# Patient Record
Sex: MALE | Race: BLACK OR AFRICAN AMERICAN | NOT HISPANIC OR LATINO | Employment: FULL TIME | URBAN - METROPOLITAN AREA
[De-identification: names, ages, dates, MRNs, and addresses within clinical notes are randomized per-mention and may not be internally consistent; named-entity substitution may affect disease eponyms.]

---

## 2024-04-06 ENCOUNTER — HOSPITAL ENCOUNTER (EMERGENCY)
Facility: HOSPITAL | Age: 34
Discharge: HOME | End: 2024-04-06
Attending: INTERNAL MEDICINE
Payer: MEDICAID

## 2024-04-06 VITALS
SYSTOLIC BLOOD PRESSURE: 118 MMHG | RESPIRATION RATE: 21 BRPM | HEART RATE: 104 BPM | DIASTOLIC BLOOD PRESSURE: 67 MMHG | WEIGHT: 300 LBS | OXYGEN SATURATION: 95 % | BODY MASS INDEX: 42.95 KG/M2 | TEMPERATURE: 99 F | HEIGHT: 70 IN

## 2024-04-06 DIAGNOSIS — R55 SYNCOPE: ICD-10-CM

## 2024-04-06 DIAGNOSIS — R56.9 SEIZURE: Primary | ICD-10-CM

## 2024-04-06 LAB
ALBUMIN SERPL-MCNC: 3.8 G/DL (ref 3.5–5)
ALBUMIN/GLOB SERPL: 0.9 RATIO (ref 1.1–2)
ALP SERPL-CCNC: 75 UNIT/L (ref 40–150)
ALT SERPL-CCNC: 51 UNIT/L (ref 0–55)
AST SERPL-CCNC: 26 UNIT/L (ref 5–34)
BASOPHILS # BLD AUTO: 0.03 X10(3)/MCL
BASOPHILS NFR BLD AUTO: 0.4 %
BILIRUB SERPL-MCNC: 0.3 MG/DL
BNP BLD-MCNC: <10 PG/ML
BUN SERPL-MCNC: 8 MG/DL (ref 8.9–20.6)
CALCIUM SERPL-MCNC: 9.3 MG/DL (ref 8.4–10.2)
CHLORIDE SERPL-SCNC: 107 MMOL/L (ref 98–107)
CO2 SERPL-SCNC: 14 MMOL/L (ref 22–29)
CREAT SERPL-MCNC: 1.05 MG/DL (ref 0.73–1.18)
EOSINOPHIL # BLD AUTO: 0.1 X10(3)/MCL (ref 0–0.9)
EOSINOPHIL NFR BLD AUTO: 1.3 %
ERYTHROCYTE [DISTWIDTH] IN BLOOD BY AUTOMATED COUNT: 14.5 % (ref 11.5–17)
GFR SERPLBLD CREATININE-BSD FMLA CKD-EPI: >60 MLS/MIN/1.73/M2
GLOBULIN SER-MCNC: 4.1 GM/DL (ref 2.4–3.5)
GLUCOSE SERPL-MCNC: 141 MG/DL (ref 74–100)
HCT VFR BLD AUTO: 38.2 % (ref 42–52)
HGB BLD-MCNC: 13.6 G/DL (ref 14–18)
IMM GRANULOCYTES # BLD AUTO: 0.21 X10(3)/MCL (ref 0–0.04)
IMM GRANULOCYTES NFR BLD AUTO: 2.7 %
LYMPHOCYTES # BLD AUTO: 2.82 X10(3)/MCL (ref 0.6–4.6)
LYMPHOCYTES NFR BLD AUTO: 36.7 %
MCH RBC QN AUTO: 27.2 PG (ref 27–31)
MCHC RBC AUTO-ENTMCNC: 35.6 G/DL (ref 33–36)
MCV RBC AUTO: 76.4 FL (ref 80–94)
MONOCYTES # BLD AUTO: 0.47 X10(3)/MCL (ref 0.1–1.3)
MONOCYTES NFR BLD AUTO: 6.1 %
NEUTROPHILS # BLD AUTO: 4.06 X10(3)/MCL (ref 2.1–9.2)
NEUTROPHILS NFR BLD AUTO: 52.8 %
PLATELET # BLD AUTO: 362 X10(3)/MCL (ref 130–400)
PMV BLD AUTO: 9.5 FL (ref 7.4–10.4)
POTASSIUM SERPL-SCNC: 3.8 MMOL/L (ref 3.5–5.1)
PROT SERPL-MCNC: 7.9 GM/DL (ref 6.4–8.3)
RBC # BLD AUTO: 5 X10(6)/MCL (ref 4.7–6.1)
SODIUM SERPL-SCNC: 140 MMOL/L (ref 136–145)
TROPONIN I SERPL-MCNC: <0.01 NG/ML (ref 0–0.04)
WBC # SPEC AUTO: 7.69 X10(3)/MCL (ref 4.5–11.5)

## 2024-04-06 PROCEDURE — 63600175 PHARM REV CODE 636 W HCPCS: Performed by: INTERNAL MEDICINE

## 2024-04-06 PROCEDURE — 83880 ASSAY OF NATRIURETIC PEPTIDE: CPT | Performed by: INTERNAL MEDICINE

## 2024-04-06 PROCEDURE — 80053 COMPREHEN METABOLIC PANEL: CPT | Performed by: INTERNAL MEDICINE

## 2024-04-06 PROCEDURE — 25000003 PHARM REV CODE 250: Performed by: INTERNAL MEDICINE

## 2024-04-06 PROCEDURE — 99285 EMERGENCY DEPT VISIT HI MDM: CPT | Mod: 25

## 2024-04-06 PROCEDURE — 85025 COMPLETE CBC W/AUTO DIFF WBC: CPT | Performed by: INTERNAL MEDICINE

## 2024-04-06 PROCEDURE — 96374 THER/PROPH/DIAG INJ IV PUSH: CPT

## 2024-04-06 PROCEDURE — 93005 ELECTROCARDIOGRAM TRACING: CPT

## 2024-04-06 PROCEDURE — 93010 ELECTROCARDIOGRAM REPORT: CPT | Mod: ,,, | Performed by: INTERNAL MEDICINE

## 2024-04-06 PROCEDURE — 84484 ASSAY OF TROPONIN QUANT: CPT | Performed by: INTERNAL MEDICINE

## 2024-04-06 PROCEDURE — 96361 HYDRATE IV INFUSION ADD-ON: CPT

## 2024-04-06 RX ORDER — LEVETIRACETAM 500 MG/1
500 TABLET ORAL 2 TIMES DAILY
Qty: 60 TABLET | Refills: 11 | Status: SHIPPED | OUTPATIENT
Start: 2024-04-06 | End: 2025-04-06

## 2024-04-06 RX ORDER — LEVETIRACETAM 500 MG/5ML
1500 INJECTION, SOLUTION, CONCENTRATE INTRAVENOUS
Status: COMPLETED | OUTPATIENT
Start: 2024-04-06 | End: 2024-04-06

## 2024-04-06 RX ADMIN — LEVETIRACETAM 1500 MG: 100 INJECTION, SOLUTION INTRAVENOUS at 01:04

## 2024-04-06 RX ADMIN — SODIUM CHLORIDE 4083 ML: 9 INJECTION, SOLUTION INTRAVENOUS at 01:04

## 2024-04-06 NOTE — ED PROVIDER NOTES
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 male who  has a past medical history of Seizures. 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 No pertinent surgical history.  Family History   Problem Relation Age of Onset    No Known Problems Mother     Diabetes Father     Hypertension Father      Social History     Tobacco Use    Smoking status: Never    Smokeless tobacco: Never   Substance Use Topics    Alcohol use: Not Currently    Drug use: Not Currently     Review of Systems   Constitutional:  Negative for fever.   HENT:  Negative for trouble swallowing and voice change.    Eyes:  Negative for visual disturbance.   Respiratory:  Negative for cough and shortness of breath.    Cardiovascular:  Negative for chest pain.   Gastrointestinal:  Negative for abdominal pain, diarrhea and vomiting.   Genitourinary:  Negative for dysuria and hematuria.   Musculoskeletal:  Negative for back pain and gait problem.   Skin:  Negative for color change and rash.   Neurological:  Positive for seizures. Negative for headaches.   Psychiatric/Behavioral:  Negative for behavioral problems and sleep disturbance.    All other systems reviewed and are negative.      Physical Exam     Initial Vitals [04/06/24 1255]   BP Pulse Resp Temp SpO2   (!) 139/100 (!) 142 (!) 22 99.1 °F (37.3 °C) 97 %      MAP       --         Physical Exam    Nursing note and vitals reviewed.  Constitutional: He appears well-developed and well-nourished. No distress.   HENT:   Head: Atraumatic.   Eyes: EOM are normal. Pupils are equal, round, and reactive to light.   Neck: Neck supple.   Cardiovascular:  Regular rhythm and normal heart sounds.            Tachycardia   Pulmonary/Chest: Breath sounds normal.   Abdominal: Abdomen is soft. Bowel sounds are normal.   Musculoskeletal:         General: Normal range of motion.      Cervical back: Neck supple. No bony tenderness.     Neurological: He is alert and oriented to person, place, and time. GCS score is 15. GCS eye subscore is 4. GCS verbal subscore is 5. GCS motor subscore is 6.   Speech Normal   Skin: Skin is warm and dry.   Psychiatric: He has a normal mood and affect.   Pleasant         ED Course   Procedures    Orders Placed This Encounter   Procedures    X-ray Chest AP Portable    Comprehensive metabolic panel    CBC auto differential    Troponin I    Brain natriuretic peptide    CBC with Differential    Urinalysis, Reflex to Urine Culture    Diet NPO    Vital signs    Cardiac Monitoring - Adult    Oxygen Continuous    Pulse Oximetry Continuous    EKG 12-LEAD    Insert saline lock     Medications   sodium chloride 0.9% bolus 4,083 mL 4,083 mL (4,083 mLs Intravenous New Bag 4/6/24 1308)   levETIRAcetam injection 1,500 mg (1,500 mg Intravenous Given 4/6/24 1335)     Admission on 04/06/2024   Component Date Value Ref Range Status    Sodium Level 04/06/2024 140  136 - 145 mmol/L Final    Potassium Level 04/06/2024 3.8  3.5 - 5.1 mmol/L Final    Chloride 04/06/2024 107  98 - 107 mmol/L Final    Carbon Dioxide 04/06/2024 14 (L)  22 - 29 mmol/L Final    Glucose Level 04/06/2024 141 (H)  74 - 100 mg/dL Final    Blood Urea Nitrogen 04/06/2024 8.0 (L)  8.9 - 20.6 mg/dL Final    Creatinine 04/06/2024 1.05  0.73 - 1.18 mg/dL Final    Calcium Level Total 04/06/2024 9.3  8.4 - 10.2 mg/dL Final    Protein Total 04/06/2024 7.9  6.4 - 8.3 gm/dL Final    Albumin Level 04/06/2024 3.8  3.5 - 5.0 g/dL Final    Globulin 04/06/2024 4.1 (H)  2.4 - 3.5 gm/dL Final    Albumin/Globulin Ratio 04/06/2024 0.9 (L)  1.1 - 2.0 ratio Final    Bilirubin Total 04/06/2024 0.3  <=1.5 mg/dL Final    Alkaline Phosphatase 04/06/2024 75  40 - 150  unit/L Final    Alanine Aminotransferase 04/06/2024 51  0 - 55 unit/L Final    Aspartate Aminotransferase 04/06/2024 26  5 - 34 unit/L Final    eGFR 04/06/2024 >60  mls/min/1.73/m2 Final    Troponin-I 04/06/2024 <0.010  0.000 - 0.045 ng/mL Final    Natriuretic Peptide 04/06/2024 <10.0  <=100.0 pg/mL Final    WBC 04/06/2024 7.69  4.50 - 11.50 x10(3)/mcL Final    RBC 04/06/2024 5.00  4.70 - 6.10 x10(6)/mcL Final    Hgb 04/06/2024 13.6 (L)  14.0 - 18.0 g/dL Final    Hct 04/06/2024 38.2 (L)  42.0 - 52.0 % Final    MCV 04/06/2024 76.4 (L)  80.0 - 94.0 fL Final    MCH 04/06/2024 27.2  27.0 - 31.0 pg Final    MCHC 04/06/2024 35.6  33.0 - 36.0 g/dL Final    RDW 04/06/2024 14.5  11.5 - 17.0 % Final    Platelet 04/06/2024 362  130 - 400 x10(3)/mcL Final    MPV 04/06/2024 9.5  7.4 - 10.4 fL Final    Neut % 04/06/2024 52.8  % Final    Lymph % 04/06/2024 36.7  % Final    Mono % 04/06/2024 6.1  % Final    Eos % 04/06/2024 1.3  % Final    Basophil % 04/06/2024 0.4  % Final    Lymph # 04/06/2024 2.82  0.6 - 4.6 x10(3)/mcL Final    Neut # 04/06/2024 4.06  2.1 - 9.2 x10(3)/mcL Final    Mono # 04/06/2024 0.47  0.1 - 1.3 x10(3)/mcL Final    Eos # 04/06/2024 0.10  0 - 0.9 x10(3)/mcL Final    Baso # 04/06/2024 0.03  <=0.2 x10(3)/mcL Final    IG# 04/06/2024 0.21 (H)  0 - 0.04 x10(3)/mcL Final    IG% 04/06/2024 2.7  % Final       Labs Reviewed   COMPREHENSIVE METABOLIC PANEL - Abnormal; Notable for the following components:       Result Value    Carbon Dioxide 14 (*)     Glucose Level 141 (*)     Blood Urea Nitrogen 8.0 (*)     Globulin 4.1 (*)     Albumin/Globulin Ratio 0.9 (*)     All other components within normal limits   CBC WITH DIFFERENTIAL - Abnormal; Notable for the following components:    Hgb 13.6 (*)     Hct 38.2 (*)     MCV 76.4 (*)     IG# 0.21 (*)     All other components within normal limits   TROPONIN I - Normal   B-TYPE NATRIURETIC PEPTIDE - Normal   CBC W/ AUTO DIFFERENTIAL    Narrative:     The following orders were  created for panel order CBC auto differential.  Procedure                               Abnormality         Status                     ---------                               -----------         ------                     CBC with Differential[[id number]]       Abnormal            Final result                 Please view results for these tests on the individual orders.   URINALYSIS, REFLEX TO URINE CULTURE        ECG Results              EKG 12-LEAD (Preliminary result)  Result time 04/06/24 13:33:41      Wet Read by Eris Short MD (04/06/24 13:33:41, [facility] - Emergency Dept, Emergency Medicine)    EKG: Independently reviewed and / or Interpreted by Eris Short MD. independently as Normal Sinus Rhythm, Rate 140, Sinus Tachycardia, Normal Axis, Normal Intervals., Non Specific T wave Changes,                                    Imaging Results              X-ray Chest AP Portable (Final result)  Result time 04/06/24 13:29:54      Final result by Sameer Tinoco MD (04/06/24 13:29:54)                   Impression:      No lobar consolidation or evidence of acute cardiac decompensation.      Electronically signed by: Sameer Tinoco  Date:    04/06/2024  Time:    13:29               Narrative:    EXAMINATION:  XR CHEST AP PORTABLE    CLINICAL HISTORY:  Syncope;    TECHNIQUE:  Single frontal view of the chest was performed.    COMPARISON:  None    FINDINGS:  The cardiomediastinal silhouette and pulmonary vasculature are within normal limits.  No lobar consolidation, pneumothorax or pleural effusion.  No acute osseous abnormality identified.                                       Medications   sodium chloride 0.9% bolus 4,083 mL 4,083 mL (4,083 mLs Intravenous New Bag 4/6/24 1308)   levETIRAcetam injection 1,500 mg (1,500 mg Intravenous Given 4/6/24 1335)     Medical Decision Making  Amount and/or Complexity of Data Reviewed  Labs: ordered.  Radiology: ordered.    Risk  Prescription drug  management.               ED Course as of 04/06/24 1537   Sat Apr 06, 2024   1358 Patient says he just over exerted himself today, and has not had a seizure for years now, but he used to be on Dilantin in the past, I have given him a dose of Keppra in the emergency room, his heart rate is coming down I am going to give him 30 mL/kg bolus and re-evaluate. 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 and then if he continues to a seizure they can always go up on it, I advised him that he should drink plenty of liquids keep himself hydrated and see his family doctor for follow-up. [GQ]      ED Course User Index  [GQ] Eris Short MD                           Clinical Impression:  Final diagnoses:  [R55] Syncope  [R56.9] Seizure (Primary)          ED Disposition Condition    Discharge Stable          ED Prescriptions       Medication Sig Dispense Start Date End Date Auth. Provider    levETIRAcetam (KEPPRA) 500 MG Tab Take 1 tablet (500 mg total) by mouth 2 (two) times daily. 60 tablet 4/6/2024 4/6/2025 Eris Short MD          Follow-up Information       Follow up With Specialties Details Why Contact Info    PMD  In  2 days      [facility] [place]  Call in 2 days  [address]  [place] [address]  [phone]             Eris Short MD  04/06/24 7869

## 2024-04-06 NOTE — DISCHARGE INSTRUCTIONS
Take medicines as prescribed    See your family doctor in one to 2 days for further evaluation, workup, and treatment as necessary    Avoid driving or operating machinery while taking medicines as some medicines might cause drowsiness and may cause problems. Also pain medicines have potential of being addictive  so use Pain meds specially Narcotics Sparingly.    The exam and treatment you received in Emergency Room was for an urgent problem and NOT INTENDED AS COMPLETE CARE. It is important that you FOLLOW UP with a doctor for ongoing care. If your symptoms become WORSE or you DO NOT IMPROVE and you are unable to reach your health care provider, you should RETURN to the emergency department. The Emergency Room doctor has provided a PRELIMINARY INTERPRETATION of all your STUDIES. A final interpretation may be done after you are discharged. IF A CHANGE in your diagnosis or treatment is needed WE WILL CONTACT YOU. It is critical that we have a CURRENT PHONE NUMBER FOR YOU.    [facility] Primary Care Providers        [facility]  MD Jessie Hardy, ANTONIO  [address] [place] [address]  [phone] [facility]  Marianne Carrasquillo, NP  [address] [place] [address]  [phone]   [facility]  ANTONIO Mckeon MD Mark Dawson, MD Danielle Duhon, MD  [address] [place] [address]       [phone] [facility]  [address], [place]  [address]  [phone]   [facility] [address]  [address] [place] [address]  [phone]    [facility]  Patricia Durand, ANTONIO  [address] [place] [address]  [phone] [facility]  Olivia Lockett, ANTONIO  [address] [place], [place] [address] [phone]   [facility]  MD Neris Britton MD, MD Amadeo RECIO  MD Bibi Doll MD  [address] [facility] [place] [address]     [phone] [facility]  MD Marbella Mitchell MD  [address] [facility]  [place], [place] [address] [phone]   Trip Pritchard NP  [address] [facility] [place] [address]  [phone] [facility]  ANTONIO Patton NP  [address] [place], [place]  [phone]   [facility]  MD Clarisa Saunders MD Paul B. Stringfellow, MD  [address] [facility] [place] [address]  [phone] [facility]   Dannie Tobar MD  [address] [place] [address]  [phone]   [facility]  [address] [place] [address]  [phone] Domenico Gutierrez MD  [address] [place] [address]  [phone]   [facility]  Marianne Monaco NP  [address] [id number] [phone] Ye Ocampo NP  [address] [place] [address]  [phone]   [facility]  [address] [place]  [address]  [phone] [facility]  [address] [place]  [address]  [phone]   [facility]   [address] [place] [address]  [phone] Radha Brewer MD  [address] [facility] [place]  [address] [place] [address]  [phone]   [facility]  [address] [place] [address].  [place] [address]  [phone] [facility]  [address] [facility] [address]  [place], [place]  [address] [phone]     [facility] Pediatric Care Providers    [facility]  DO Leopoldo Garrido MD  [address] [place] [address] [place] [address]  [phone] Priyanka Rodriguez MD  [address] [place] [address]  [place] [address]  [phone]   [facility] [place] Manrique, RONALD  [address] [place] [address]  [phone] Mariah Gandhi,  MD  [address] [address]  [phone]   Revised /14/2021

## 2024-04-07 LAB
OHS QRS DURATION: 86 MS
OHS QTC CALCULATION: 442 MS